# Patient Record
Sex: FEMALE | Race: BLACK OR AFRICAN AMERICAN | URBAN - METROPOLITAN AREA
[De-identification: names, ages, dates, MRNs, and addresses within clinical notes are randomized per-mention and may not be internally consistent; named-entity substitution may affect disease eponyms.]

---

## 2018-11-29 PROBLEM — 266435005 GASTRO-ESOPHAGEAL REFLUX DISEASE WITHOUT ESOPHAGITIS: Status: ACTIVE | Noted: 2018-11-29

## 2018-11-29 PROBLEM — 275978004 SCREENING FOR MALIGNANT NEOPLASM OF COLON: Status: ACTIVE | Noted: 2018-11-29

## 2019-01-10 PROBLEM — 80774000 HELICOBACTER PYLORI: Status: ACTIVE | Noted: 2019-01-10

## 2022-04-30 ENCOUNTER — TELEPHONE ENCOUNTER (OUTPATIENT)
Dept: URBAN - METROPOLITAN AREA CLINIC 121 | Facility: CLINIC | Age: 67
End: 2022-04-30

## 2022-04-30 RX ORDER — NITAZOXANIDE 500 MG/1
1 TABLET PO BID X 14 DAYS TABLET ORAL
OUTPATIENT
Start: 2018-12-03 | End: 2018-12-17

## 2022-04-30 RX ORDER — DOXYCYCLINE 100 MG/1
1 TABLET PO QPM X 14 DAYS TABLET, FILM COATED ORAL
OUTPATIENT
Start: 2018-12-03 | End: 2018-12-10

## 2022-04-30 RX ORDER — OMEPRAZOLE 40 MG/1
TAKE 1 CAPSULE PO Q12H FOR 14 DAYS, FOR H. PYLORI INFECTION CAPSULE, DELAYED RELEASE ORAL
OUTPATIENT
Start: 2019-01-30 | End: 2019-02-13

## 2022-04-30 RX ORDER — LEVOFLOXACIN 250 MG
1 TABLET PO QAM X 14 DAYS TABLET ORAL
OUTPATIENT
Start: 2018-12-03 | End: 2018-12-10

## 2022-04-30 RX ORDER — OMEPRAZOLE 40 MG/1
1 CAPSULE PO BID X 14 DAYS CAPSULE, DELAYED RELEASE ORAL
OUTPATIENT
Start: 2018-12-03 | End: 2018-12-17

## 2022-05-01 ENCOUNTER — TELEPHONE ENCOUNTER (OUTPATIENT)
Dept: URBAN - METROPOLITAN AREA CLINIC 121 | Facility: CLINIC | Age: 67
End: 2022-05-01

## 2022-05-01 RX ORDER — OMEPRAZOLE 20 MG/1
TAKE 1 PO DAILY CAPSULE, DELAYED RELEASE ORAL
Status: ACTIVE | COMMUNITY
Start: 2018-11-29

## 2022-05-01 RX ORDER — SODIUM SULFATE, POTASSIUM SULFATE, MAGNESIUM SULFATE 17.5; 3.13; 1.6 G/ML; G/ML; G/ML
MIX AND USE AS DIRECTED SOLUTION, CONCENTRATE ORAL
Status: ACTIVE | COMMUNITY
Start: 2018-11-29

## 2022-05-01 RX ORDER — LEVOFLOXACIN 250 MG/1
TAKE 1 TABLET PO Q12H FOR 14 DAYS, FOR H. PYLORI INFECTION TABLET, FILM COATED ORAL
Status: ACTIVE | COMMUNITY
Start: 2019-01-30

## 2022-05-01 RX ORDER — HALOPERIDOL 10 MG/1
TABLET ORAL
Status: ACTIVE | COMMUNITY
Start: 2018-11-29

## 2022-05-01 RX ORDER — AMOXICILLIN 500 MG/1
TAKE 2 CAPSULE PO Q12H FOR 14 DAYS, FOR H. PYLORI INFECTION CAPSULE ORAL
Status: ACTIVE | COMMUNITY
Start: 2019-01-30

## 2022-05-01 RX ORDER — BISMUTH SUBCITRATE POTASSIUM, METRONIDAZOLE, TETRACYCLINE HYDROCHLORIDE 140; 125; 125 MG/1; MG/1; MG/1
CAPSULE ORAL
Status: ACTIVE | COMMUNITY
Start: 2019-02-14

## 2022-05-01 RX ORDER — LOSARTAN POTASSIUM AND HYDROCHLOROTHIAZIDE 50; 12.5 MG/1; MG/1
TABLET, FILM COATED ORAL
Status: ACTIVE | COMMUNITY
Start: 2018-11-29

## 2022-05-01 RX ORDER — CELECOXIB 200 MG/1
CAPSULE ORAL
Status: ACTIVE | COMMUNITY
Start: 2018-11-29

## 2022-05-01 RX ORDER — DIVALPROEX SODIUM 500 MG/1
TABLET, FILM COATED, EXTENDED RELEASE ORAL
Status: ACTIVE | COMMUNITY
Start: 2018-11-29

## 2025-06-12 ENCOUNTER — OFFICE VISIT (OUTPATIENT)
Dept: URBAN - METROPOLITAN AREA CLINIC 27 | Facility: CLINIC | Age: 70
End: 2025-06-12
Payer: MEDICARE

## 2025-06-12 ENCOUNTER — DASHBOARD ENCOUNTERS (OUTPATIENT)
Age: 70
End: 2025-06-12

## 2025-06-12 DIAGNOSIS — Z86.0100 HISTORY OF COLON POLYPS: ICD-10-CM

## 2025-06-12 DIAGNOSIS — B96.81 HELICOBACTER PYLORI [H. PYLORI] AS THE CAUSE OF DISEASES CLASSIFIED ELSEWHERE: ICD-10-CM

## 2025-06-12 PROCEDURE — 99243 OFF/OP CNSLTJ NEW/EST LOW 30: CPT | Performed by: PHYSICIAN ASSISTANT

## 2025-06-12 PROCEDURE — 99203 OFFICE O/P NEW LOW 30 MIN: CPT | Performed by: PHYSICIAN ASSISTANT

## 2025-06-12 RX ORDER — BISMUTH SUBCITRATE POTASSIUM, METRONIDAZOLE, AND TETRACYCLINE HYDROCHLORIDE 140; 125; 125 MG/1; MG/1; MG/1
CAPSULE ORAL
Status: DISCONTINUED | COMMUNITY
Start: 2019-02-14

## 2025-06-12 RX ORDER — LOSARTAN POTASSIUM AND HYDROCHLOROTHIAZIDE 50; 12.5 MG/1; MG/1
TABLET, FILM COATED ORAL
Status: ACTIVE | COMMUNITY
Start: 2018-11-29

## 2025-06-12 RX ORDER — HALOPERIDOL 10 MG/1
TABLET ORAL
Status: DISCONTINUED | COMMUNITY
Start: 2018-11-29

## 2025-06-12 RX ORDER — CELECOXIB 200 MG/1
CAPSULE ORAL
Status: ACTIVE | COMMUNITY
Start: 2018-11-29

## 2025-06-12 RX ORDER — DIVALPROEX SODIUM 500 MG/1
TABLET, FILM COATED, EXTENDED RELEASE ORAL
Status: DISCONTINUED | COMMUNITY
Start: 2018-11-29

## 2025-06-12 RX ORDER — OMEPRAZOLE 20 MG/1
TAKE 1 PO DAILY CAPSULE, DELAYED RELEASE ORAL
Status: DISCONTINUED | COMMUNITY
Start: 2018-11-29

## 2025-06-12 RX ORDER — TIRZEPATIDE 7.5 MG/.5ML
INJECTION, SOLUTION SUBCUTANEOUS
Qty: 2 MILLILITER | Status: ACTIVE | COMMUNITY

## 2025-06-12 RX ORDER — LEVOFLOXACIN 250 MG/1
TAKE 1 TABLET PO Q12H FOR 14 DAYS, FOR H. PYLORI INFECTION TABLET, FILM COATED ORAL
Status: DISCONTINUED | COMMUNITY
Start: 2019-01-30

## 2025-06-12 RX ORDER — AMOXICILLIN 500 MG/1
TAKE 2 CAPSULE PO Q12H FOR 14 DAYS, FOR H. PYLORI INFECTION CAPSULE ORAL
Status: DISCONTINUED | COMMUNITY
Start: 2019-01-30

## 2025-06-12 RX ORDER — HALOPERIDOL DECANOATE 100 MG/ML
INJECT 100MG INTRAMUSCULARLY ONCE EVERY 30 DAYS INJECTION INTRAMUSCULAR
Qty: 3 MILLILITER | Refills: 0 | Status: ACTIVE | COMMUNITY

## 2025-06-12 RX ORDER — PRAVASTATIN SODIUM 40 MG/1
TABLET ORAL
Qty: 90 TABLET | Status: ACTIVE | COMMUNITY

## 2025-06-12 RX ORDER — SODIUM SULFATE, POTASSIUM SULFATE, MAGNESIUM SULFATE 17.5; 3.13; 1.6 G/ML; G/ML; G/ML
MIX AND USE AS DIRECTED SOLUTION, CONCENTRATE ORAL
Status: DISCONTINUED | COMMUNITY
Start: 2018-11-29

## 2025-06-12 NOTE — HPI-TODAY'S VISIT:
Ms. Field is a 69-year-old female seen at the request of Dr. Maulik Martines for CRC screening. A copy of this document will be sent to the referring physician. She recalls having 3 normal colonoscopies in her life. Her last colonoscopy with Dr. Alexander in 2018 was significant for cecal colitis, diverticulosis and small internal hemorrhoids. Dr. Alexander recommended a repeat colonoscopy in 2028. Patient denies nausea, vomiting, abdominal pain, heartburn, diarrhea, melena, hematochezia, anemia and weight loss. The patient does not have a FMHX of colon polyps nor colon cancer. She has a history of H pylori (dx'd 2018 and ? Treatment in 2019). It isn't clear if the bacteria was eradicated.

## 2025-06-13 LAB — H PYLORI BREATH TEST: NOT DETECTED
